# Patient Record
Sex: FEMALE | Race: WHITE | NOT HISPANIC OR LATINO | URBAN - METROPOLITAN AREA
[De-identification: names, ages, dates, MRNs, and addresses within clinical notes are randomized per-mention and may not be internally consistent; named-entity substitution may affect disease eponyms.]

---

## 2022-09-01 ENCOUNTER — APPOINTMENT (RX ONLY)
Dept: URBAN - METROPOLITAN AREA CLINIC 146 | Facility: CLINIC | Age: 62
Setting detail: DERMATOLOGY
End: 2022-09-01

## 2022-09-01 DIAGNOSIS — L55.9 SUNBURN, UNSPECIFIED: ICD-10-CM

## 2022-09-01 DIAGNOSIS — L57.0 ACTINIC KERATOSIS: ICD-10-CM

## 2022-09-01 DIAGNOSIS — D18.0 HEMANGIOMA: ICD-10-CM

## 2022-09-01 DIAGNOSIS — L82.0 INFLAMED SEBORRHEIC KERATOSIS: ICD-10-CM

## 2022-09-01 DIAGNOSIS — L81.4 OTHER MELANIN HYPERPIGMENTATION: ICD-10-CM

## 2022-09-01 DIAGNOSIS — L82.1 OTHER SEBORRHEIC KERATOSIS: ICD-10-CM

## 2022-09-01 DIAGNOSIS — L98.8 OTHER SPECIFIED DISORDERS OF THE SKIN AND SUBCUTANEOUS TISSUE: ICD-10-CM

## 2022-09-01 DIAGNOSIS — L90.8 OTHER ATROPHIC DISORDERS OF SKIN: ICD-10-CM

## 2022-09-01 DIAGNOSIS — Z12.83 ENCOUNTER FOR SCREENING FOR MALIGNANT NEOPLASM OF SKIN: ICD-10-CM

## 2022-09-01 PROBLEM — D18.01 HEMANGIOMA OF SKIN AND SUBCUTANEOUS TISSUE: Status: ACTIVE | Noted: 2022-09-01

## 2022-09-01 PROCEDURE — ? RECOMMENDATIONS

## 2022-09-01 PROCEDURE — ? COUNSELING

## 2022-09-01 PROCEDURE — ? COSMETIC CONSULTATION: BOTOX

## 2022-09-01 PROCEDURE — 17110 DESTRUCTION B9 LES UP TO 14: CPT

## 2022-09-01 PROCEDURE — 99213 OFFICE O/P EST LOW 20 MIN: CPT | Mod: 25

## 2022-09-01 PROCEDURE — ? LIQUID NITROGEN

## 2022-09-01 PROCEDURE — ? PRESCRIPTION

## 2022-09-01 RX ORDER — FLUOROURACIL 2 G/40G
CREAM TOPICAL QHS
Qty: 40 | Refills: 0 | COMMUNITY
Start: 2022-09-01

## 2022-09-01 RX ADMIN — FLUOROURACIL: 2 CREAM TOPICAL at 00:00

## 2022-09-01 ASSESSMENT — LOCATION SIMPLE DESCRIPTION DERM
LOCATION SIMPLE: LEFT ANTERIOR NECK
LOCATION SIMPLE: RIGHT UPPER BACK
LOCATION SIMPLE: UPPER BACK
LOCATION SIMPLE: LEFT SHOULDER
LOCATION SIMPLE: ABDOMEN
LOCATION SIMPLE: CHEST
LOCATION SIMPLE: LEFT UPPER BACK
LOCATION SIMPLE: RIGHT SHOULDER
LOCATION SIMPLE: LEFT SUPERIOR EYELID

## 2022-09-01 ASSESSMENT — LOCATION DETAILED DESCRIPTION DERM
LOCATION DETAILED: UPPER STERNUM
LOCATION DETAILED: LEFT INFERIOR ANTERIOR NECK
LOCATION DETAILED: LEFT LATERAL SUPERIOR EYELID
LOCATION DETAILED: LEFT SUPERIOR UPPER BACK
LOCATION DETAILED: RIGHT MEDIAL SUPERIOR CHEST
LOCATION DETAILED: RIGHT SUPERIOR UPPER BACK
LOCATION DETAILED: RIGHT ANTERIOR SHOULDER
LOCATION DETAILED: RIGHT INFERIOR MEDIAL UPPER BACK
LOCATION DETAILED: EPIGASTRIC SKIN
LOCATION DETAILED: LEFT INFERIOR MEDIAL UPPER BACK
LOCATION DETAILED: LEFT ANTERIOR SHOULDER
LOCATION DETAILED: LEFT MEDIAL SUPERIOR CHEST
LOCATION DETAILED: SUBXIPHOID
LOCATION DETAILED: SUPERIOR THORACIC SPINE

## 2022-09-01 ASSESSMENT — LOCATION ZONE DERM
LOCATION ZONE: NECK
LOCATION ZONE: TRUNK
LOCATION ZONE: EYELID
LOCATION ZONE: ARM

## 2022-09-01 NOTE — PROCEDURE: RECOMMENDATIONS
Recommendation Preamble: The following recommendations were made during the visit: hats, sunglasses and UV clothing.
Detail Level: Zone
Render Risk Assessment In Note?: no

## 2022-09-01 NOTE — PROCEDURE: COUNSELING
Detail Level: Zone
Sunscreen Recommendations: Sunblock with zinc encouraged.
Detail Level: Detailed
Detail Level: Simple

## 2022-09-01 NOTE — PROCEDURE: LIQUID NITROGEN
Spray Paint Text: The liquid nitrogen was applied to the skin utilizing a spray paint frosting technique.
Render Post-Care Instructions In Note?: no
Show Applicator Variable?: Yes
Post-Care Instructions: I reviewed with the patient in detail post-care instructions. Patient is to wear sunprotection, and avoid picking at any of the treated lesions. Pt may apply Vaseline to crusted or scabbing areas.
Detail Level: Detailed
Medical Necessity Information: It is in your best interest to select a reason for this procedure from the list below. All of these items fulfill various CMS LCD requirements except the new and changing color options.
Consent: The patient's consent was obtained including but not limited to risks of crusting, scabbing, blistering, scarring, darker or lighter pigmentary change, recurrence, incomplete removal and infection.
Medical Necessity Clause: This procedure was medically necessary because the lesions that were treated were:

## 2022-10-12 ENCOUNTER — APPOINTMENT (RX ONLY)
Dept: URBAN - METROPOLITAN AREA CLINIC 146 | Facility: CLINIC | Age: 62
Setting detail: DERMATOLOGY
End: 2022-10-12

## 2022-10-12 DIAGNOSIS — H02.83 DERMATOCHALASIS OF EYELID: ICD-10-CM

## 2022-10-12 DIAGNOSIS — H02.3 BLEPHAROCHALASIS: ICD-10-CM | Status: WORSENING

## 2022-10-12 DIAGNOSIS — Z41.1 ENCOUNTER FOR COSMETIC SURGERY: ICD-10-CM

## 2022-10-12 PROBLEM — H02.839 DERMATOCHALASIS OF UNSPECIFIED EYE, UNSPECIFIED EYELID: Status: ACTIVE | Noted: 2022-10-12

## 2022-10-12 PROBLEM — H02.30 BLEPHAROCHALASIS UNSPECIFIED EYE, UNSPECIFIED EYELID: Status: ACTIVE | Noted: 2022-10-12

## 2022-10-12 PROCEDURE — 99213 OFFICE O/P EST LOW 20 MIN: CPT

## 2022-10-12 PROCEDURE — ? COUNSELING - DERMATOCHALASIS

## 2022-10-12 PROCEDURE — ? COUNSELING - BLEPHAROCHALASIS

## 2022-10-12 PROCEDURE — ? CONSULTATION - FACELIFT

## 2022-10-12 ASSESSMENT — LOCATION SIMPLE DESCRIPTION DERM
LOCATION SIMPLE: LEFT CHEEK
LOCATION SIMPLE: LEFT SUPERIOR EYELID
LOCATION SIMPLE: RIGHT SUPERIOR EYELID

## 2022-10-12 ASSESSMENT — LOCATION ZONE DERM
LOCATION ZONE: FACE
LOCATION ZONE: EYELID

## 2022-10-12 ASSESSMENT — LOCATION DETAILED DESCRIPTION DERM
LOCATION DETAILED: LEFT CENTRAL MALAR CHEEK
LOCATION DETAILED: LEFT LATERAL SUPERIOR EYELID
LOCATION DETAILED: RIGHT LATERAL SUPERIOR EYELID

## 2022-10-12 NOTE — PROCEDURE: COUNSELING - DERMATOCHALASIS
Count Minor/Major Decisions Toward Mdm (Not Cosmetic)?: Yes
Detail Level: Detailed
Count Minor/Major Decisions Toward Mdm (Not Cosmetic)?: No

## 2022-10-12 NOTE — PROCEDURE: CONSULTATION - FACELIFT
Detail Level: Zone
Procedure Quote $ (Will Render In Note. Use Numbers Only, No Text Please.): 3950.00
Count Minor/Major Decisions Toward Mdm (Not Cosmetic)?: No

## 2022-10-12 NOTE — HPI: EYE (DERMATOCHALASIS)
How Severe Is The Dermatochalasis?: moderate
Is This A New Presentation, Or A Follow-Up?: Dermatochalasis
Additional History: Patient wears reading glasses, + dry eyes, had lasik Discussed risks, benefits and cautions of procedure, including but not limited to scarring, slow wound healing, bleeding, infection, and/or recurrence if not completely removed. 22yrs ago.  Former smoker, - blood thinners, no cataracts, no glaucoma

## 2023-02-10 ENCOUNTER — RX ONLY (OUTPATIENT)
Age: 63
Setting detail: RX ONLY
End: 2023-02-10

## 2023-02-10 RX ORDER — ERYTHROMYCIN 5 MG/G
OINTMENT OPHTHALMIC
Qty: 3.5 | Refills: 0 | Status: ERX

## 2023-02-10 RX ORDER — DIAZEPAM 10 MG/1
TABLET ORAL
Qty: 1 | Refills: 0 | Status: ERX

## 2023-02-10 RX ORDER — OXYCODONE HYDROCHLORIDE AND ACETAMINOPHEN 5; 325 MG/1; MG/1
TABLET ORAL
Qty: 18 | Refills: 0 | Status: ERX

## 2023-02-10 RX ORDER — ONDANSETRON 8 MG/1
TABLET, ORALLY DISINTEGRATING ORAL
Qty: 8 | Refills: 0 | Status: ERX

## 2023-02-13 ENCOUNTER — RX ONLY (OUTPATIENT)
Age: 63
Setting detail: RX ONLY
End: 2023-02-13

## 2023-02-13 RX ORDER — CEPHALEXIN 500 MG/1
CAPSULE ORAL
Qty: 21 | Refills: 0 | Status: ERX

## 2023-02-14 ENCOUNTER — APPOINTMENT (RX ONLY)
Dept: URBAN - METROPOLITAN AREA CLINIC 146 | Facility: CLINIC | Age: 63
Setting detail: DERMATOLOGY
End: 2023-02-14

## 2023-02-14 DIAGNOSIS — Z41.1 ENCOUNTER FOR COSMETIC SURGERY: ICD-10-CM

## 2023-02-14 DIAGNOSIS — H02.83 DERMATOCHALASIS OF EYELID: ICD-10-CM

## 2023-02-14 PROBLEM — H02.839 DERMATOCHALASIS OF UNSPECIFIED EYE, UNSPECIFIED EYELID: Status: ACTIVE | Noted: 2023-02-14

## 2023-02-14 PROCEDURE — ? FACE LIFT

## 2023-02-14 PROCEDURE — ? BLEPHAROPLASTY

## 2023-02-14 NOTE — PROCEDURE: BLEPHAROPLASTY
Intro: The patient was prepared and draped in the usual sterile fashion.
Surgeon: Dr. Corbin Reese
Preparation: A time-out was taken prior to the procedure to identify the patient and surgical sites. An intravenous line was established and cardiac monitors were placed.
Skin Marking: The area of eyelid skin to be excised from the upper eyelid was marked out with a marking pen.
Local Anesthesia Volume In Cc: 0
Hemostasis: electrocautery
Estimated Blood Loss (Cc): minimal
Skin Closure: simple interrupted
Skin Marking: Incisions were marked out with a marking pen approximately 1 mm inferior to the lash line from the level of the inferior punctum and extending laterally from the lateral canthus in a slightly downward direction for approximately 1 cm.
Conjunctival Closure Sutures: 6-0 fast absorbing plain gut
Consent: The risks, benefits, expectations and alternatives of blepharoplasty were discussed in detail with the patient, including, but not limited to, the risks of postoperative infection, bleeding, delayed healing, injury to the globe,  scarring, pain, asymmetry, loss of vision, lagophthalmos, ectropion, entropion, corneal abrasion, possible need for additional procedures, and dissatisfaction with cosmetic outcome. The patient verbalized understanding and nformed consent was obtained.
Detail Level: Generalized

## 2023-02-14 NOTE — PROCEDURE: FACE LIFT
Liposuction Volume In Cc: 0
Positioning: The patient was brought to the operating room and placed in the standard supine position in the usual manner. After placement of monitors, anesthesia was achieved as above uneventfully.
Postauricular Epidermal Closure: horizontal mattress
Skin Redraping: The skin was then redraped on both sides, creating a sharp, cervico-mental angle, while minimizing undue tension on the skin flaps. Key sutures were placed near the anterior hairline and the apex of the posterior hairline. A slit was fashioned for delivery of the ear lobule, being careful to avoid pixie ear deformity. The redundant skin was then carefully and precisely marked, and sharply resected.
Skin Flap Text: A skin flap was raised superficial to the SMAS layer down to the mastoid fascia posteriorly. The flap was dissected at the level of the SMAS until the marked area of the limit of dissection was reached. Care was paid to avoidance of injury to the facial nerve and its branches, and to the greater auricular and spinal accessory nerves. This sequence was conducted first on the right side, and then on the left. Once the flaps were raised, attention was directed to the submental region.
Estimated Blood Loss (Cc): minimal
Detail Level: Zone
Surgeon: Dr. Reuben Richard
Hemostasis: electrocautery
Drain Text: The drains were placed through a stab incision in the mastoid scalp and delivered under the flap. Each drain was secured with 4-0 Nylon suture. Each drain was connected to the suction device at the completion of the procedure.
Consent: The risks, benefits, expectations and alternatives of a face lift were discussed with the patient including, but not limited to, infection, bleeding, injury to nerves, blood vessels or other structures, delayed healing, visible scarring, contour irregularities, asymmetry, cosmetic dissatisfaction and unplanned return to the operating room. The patient read and signed the consent form and verbalized full understanding. A time-out was performed confirming the identities of the patient and surgeon, the operative site(s) and the operative plan per the informed consent.

## 2023-02-15 ENCOUNTER — APPOINTMENT (RX ONLY)
Dept: URBAN - METROPOLITAN AREA CLINIC 146 | Facility: CLINIC | Age: 63
Setting detail: DERMATOLOGY
End: 2023-02-15

## 2023-02-15 DIAGNOSIS — Z48.89 ENCOUNTER FOR OTHER SPECIFIED SURGICAL AFTERCARE: ICD-10-CM

## 2023-02-15 PROCEDURE — ? POST OP EVALUATION BLEPHAROPLASTY

## 2023-02-15 PROCEDURE — 99024 POSTOP FOLLOW-UP VISIT: CPT

## 2023-02-15 PROCEDURE — ? POST-OP CHECK

## 2023-02-15 ASSESSMENT — LOCATION DETAILED DESCRIPTION DERM
LOCATION DETAILED: LEFT TRAGUS
LOCATION DETAILED: RIGHT TRAGUS

## 2023-02-15 ASSESSMENT — LOCATION SIMPLE DESCRIPTION DERM
LOCATION SIMPLE: RIGHT EAR
LOCATION SIMPLE: LEFT EAR

## 2023-02-15 ASSESSMENT — LOCATION ZONE DERM: LOCATION ZONE: EAR

## 2023-02-15 NOTE — PROCEDURE: POST-OP CHECK
Detail Level: Detailed
Add Postop Global No-Charge Code (68951)?: yes
Wound Evaluated By: Dr. Jeanne Avendano
Wound Evaluated By: Dr. Jesus Lora

## 2023-02-21 ENCOUNTER — APPOINTMENT (RX ONLY)
Dept: URBAN - METROPOLITAN AREA CLINIC 146 | Facility: CLINIC | Age: 63
Setting detail: DERMATOLOGY
End: 2023-02-21

## 2023-02-21 DIAGNOSIS — Z48.89 ENCOUNTER FOR OTHER SPECIFIED SURGICAL AFTERCARE: ICD-10-CM

## 2023-02-21 PROCEDURE — 99024 POSTOP FOLLOW-UP VISIT: CPT

## 2023-02-21 PROCEDURE — ? SUTURE REMOVAL

## 2023-02-21 ASSESSMENT — LOCATION DETAILED DESCRIPTION DERM
LOCATION DETAILED: LEFT SUPERIOR PREAURICULAR CHEEK
LOCATION DETAILED: LEFT LATERAL SUPERIOR EYELID
LOCATION DETAILED: RIGHT LATERAL SUPERIOR EYELID
LOCATION DETAILED: RIGHT TRAGUS

## 2023-02-21 ASSESSMENT — LOCATION ZONE DERM
LOCATION ZONE: FACE
LOCATION ZONE: EYELID
LOCATION ZONE: EAR

## 2023-02-21 ASSESSMENT — LOCATION SIMPLE DESCRIPTION DERM
LOCATION SIMPLE: LEFT CHEEK
LOCATION SIMPLE: LEFT SUPERIOR EYELID
LOCATION SIMPLE: RIGHT SUPERIOR EYELID
LOCATION SIMPLE: RIGHT EAR

## 2023-03-01 ENCOUNTER — APPOINTMENT (RX ONLY)
Dept: URBAN - METROPOLITAN AREA CLINIC 146 | Facility: CLINIC | Age: 63
Setting detail: DERMATOLOGY
End: 2023-03-01

## 2023-03-01 DIAGNOSIS — L21.8 OTHER SEBORRHEIC DERMATITIS: ICD-10-CM

## 2023-03-01 DIAGNOSIS — R22.9 LOCALIZED SWELLING, MASS AND LUMP, UNSPECIFIED: ICD-10-CM

## 2023-03-01 DIAGNOSIS — L81.4 OTHER MELANIN HYPERPIGMENTATION: ICD-10-CM

## 2023-03-01 DIAGNOSIS — L57.8 OTHER SKIN CHANGES DUE TO CHRONIC EXPOSURE TO NONIONIZING RADIATION: ICD-10-CM

## 2023-03-01 DIAGNOSIS — L98.8 OTHER SPECIFIED DISORDERS OF THE SKIN AND SUBCUTANEOUS TISSUE: ICD-10-CM

## 2023-03-01 DIAGNOSIS — Z12.83 ENCOUNTER FOR SCREENING FOR MALIGNANT NEOPLASM OF SKIN: ICD-10-CM

## 2023-03-01 DIAGNOSIS — D18.0 HEMANGIOMA: ICD-10-CM

## 2023-03-01 DIAGNOSIS — L82.1 OTHER SEBORRHEIC KERATOSIS: ICD-10-CM

## 2023-03-01 PROBLEM — D18.01 HEMANGIOMA OF SKIN AND SUBCUTANEOUS TISSUE: Status: ACTIVE | Noted: 2023-03-01

## 2023-03-01 PROCEDURE — ? PRESCRIPTION

## 2023-03-01 PROCEDURE — ? COUNSELING

## 2023-03-01 PROCEDURE — ? RECOMMENDATIONS

## 2023-03-01 PROCEDURE — ? PRESCRIPTION MEDICATION MANAGEMENT

## 2023-03-01 PROCEDURE — 99214 OFFICE O/P EST MOD 30 MIN: CPT

## 2023-03-01 RX ORDER — TRETIONIN 0.5 MG/G
CREAM TOPICAL QHS
Qty: 45 | Refills: 3 | Status: ERX | COMMUNITY
Start: 2023-03-01

## 2023-03-01 RX ADMIN — TRETIONIN: 0.5 CREAM TOPICAL at 00:00

## 2023-03-01 ASSESSMENT — LOCATION ZONE DERM
LOCATION ZONE: ARM
LOCATION ZONE: SCALP
LOCATION ZONE: EYELID
LOCATION ZONE: EAR
LOCATION ZONE: TRUNK

## 2023-03-01 ASSESSMENT — LOCATION SIMPLE DESCRIPTION DERM
LOCATION SIMPLE: ABDOMEN
LOCATION SIMPLE: RIGHT SHOULDER
LOCATION SIMPLE: UPPER BACK
LOCATION SIMPLE: LEFT SUPERIOR EYELID
LOCATION SIMPLE: LEFT UPPER BACK
LOCATION SIMPLE: RIGHT UPPER BACK
LOCATION SIMPLE: CHEST
LOCATION SIMPLE: RIGHT SUPERIOR EYELID
LOCATION SIMPLE: LEFT EAR
LOCATION SIMPLE: RIGHT EAR
LOCATION SIMPLE: LEFT SHOULDER
LOCATION SIMPLE: SCALP

## 2023-03-01 ASSESSMENT — LOCATION DETAILED DESCRIPTION DERM
LOCATION DETAILED: RIGHT LATERAL SUPERIOR EYELID
LOCATION DETAILED: SUBXIPHOID
LOCATION DETAILED: EPIGASTRIC SKIN
LOCATION DETAILED: LEFT CRUS OF HELIX
LOCATION DETAILED: RIGHT MEDIAL SUPERIOR CHEST
LOCATION DETAILED: RIGHT ANTERIOR SHOULDER
LOCATION DETAILED: RIGHT CAVUM CONCHA
LOCATION DETAILED: LEFT INFERIOR MEDIAL UPPER BACK
LOCATION DETAILED: UPPER STERNUM
LOCATION DETAILED: LEFT SUPERIOR UPPER BACK
LOCATION DETAILED: RIGHT INFERIOR MEDIAL UPPER BACK
LOCATION DETAILED: RIGHT CENTRAL POSTAURICULAR SKIN
LOCATION DETAILED: LEFT CENTRAL POSTAURICULAR SKIN
LOCATION DETAILED: MIDDLE STERNUM
LOCATION DETAILED: LEFT ANTERIOR SHOULDER
LOCATION DETAILED: SUPERIOR THORACIC SPINE
LOCATION DETAILED: LEFT LATERAL SUPERIOR EYELID
LOCATION DETAILED: LEFT MEDIAL SUPERIOR CHEST
LOCATION DETAILED: RIGHT SUPERIOR UPPER BACK

## 2023-03-01 NOTE — PROCEDURE: COUNSELING
Sunscreen Recommendations: Sunblock with zinc encouraged.
Detail Level: Zone
Detail Level: Detailed
Patient Specific Counseling (Will Not Stick From Patient To Patient): Rec OTC Hydrocortisone 1% cream pt advised to call if symptoms do not resolve

## 2023-03-01 NOTE — PROCEDURE: PRESCRIPTION MEDICATION MANAGEMENT
Detail Level: Zone
Initiate Treatment: Fluorouracil cream to nose, upper lip and chest ( pt has RX at home ) after wedding
Render In Strict Bullet Format?: No

## 2023-03-10 ENCOUNTER — APPOINTMENT (RX ONLY)
Dept: URBAN - METROPOLITAN AREA CLINIC 146 | Facility: CLINIC | Age: 63
Setting detail: DERMATOLOGY
End: 2023-03-10

## 2023-03-10 DIAGNOSIS — Z48.89 ENCOUNTER FOR OTHER SPECIFIED SURGICAL AFTERCARE: ICD-10-CM

## 2023-03-10 PROCEDURE — ? POST-OP CHECK

## 2023-03-10 PROCEDURE — ? POST OP EVALUATION BLEPHAROPLASTY

## 2023-03-10 PROCEDURE — 99024 POSTOP FOLLOW-UP VISIT: CPT

## 2023-03-10 ASSESSMENT — LOCATION ZONE DERM
LOCATION ZONE: EYELID
LOCATION ZONE: EAR

## 2023-03-10 ASSESSMENT — LOCATION DETAILED DESCRIPTION DERM
LOCATION DETAILED: RIGHT LATERAL SUPERIOR EYELID
LOCATION DETAILED: LEFT LATERAL SUPERIOR EYELID
LOCATION DETAILED: RIGHT ANTERIOR EARLOBE

## 2023-03-10 ASSESSMENT — LOCATION SIMPLE DESCRIPTION DERM
LOCATION SIMPLE: LEFT SUPERIOR EYELID
LOCATION SIMPLE: RIGHT EAR
LOCATION SIMPLE: RIGHT SUPERIOR EYELID

## 2023-03-10 NOTE — PROCEDURE: POST-OP CHECK
Body Location Override (Optional): bilateral  pre/post auricular
Detail Level: Detailed
Add Postop Global No-Charge Code (81677)?: yes
Wound Evaluated By: Dr. Corbin Reese

## 2023-05-08 ENCOUNTER — APPOINTMENT (RX ONLY)
Dept: URBAN - METROPOLITAN AREA CLINIC 146 | Facility: CLINIC | Age: 63
Setting detail: DERMATOLOGY
End: 2023-05-08

## 2023-05-08 DIAGNOSIS — Z48.89 ENCOUNTER FOR OTHER SPECIFIED SURGICAL AFTERCARE: ICD-10-CM

## 2023-05-08 PROCEDURE — 99024 POSTOP FOLLOW-UP VISIT: CPT

## 2023-05-08 PROCEDURE — ? POST OP EVALUATION BLEPHAROPLASTY

## 2023-05-08 PROCEDURE — ? POST-OP CHECK

## 2023-05-08 ASSESSMENT — LOCATION SIMPLE DESCRIPTION DERM
LOCATION SIMPLE: RIGHT SUPERIOR EYELID
LOCATION SIMPLE: RIGHT EAR
LOCATION SIMPLE: LEFT SUPERIOR EYELID

## 2023-05-08 ASSESSMENT — LOCATION DETAILED DESCRIPTION DERM
LOCATION DETAILED: LEFT LATERAL SUPERIOR EYELID
LOCATION DETAILED: RIGHT ANTERIOR EARLOBE
LOCATION DETAILED: RIGHT LATERAL SUPERIOR EYELID

## 2023-05-08 ASSESSMENT — LOCATION ZONE DERM
LOCATION ZONE: EYELID
LOCATION ZONE: EAR

## 2023-05-08 NOTE — PROCEDURE: POST-OP CHECK
Body Location Override (Optional): bilateral  pre/post auricular
Detail Level: Detailed
Add Postop Global No-Charge Code (08839)?: yes
Wound Evaluated By: Dr. Rekha Iqbal

## 2023-09-01 ENCOUNTER — APPOINTMENT (RX ONLY)
Dept: URBAN - METROPOLITAN AREA CLINIC 146 | Facility: CLINIC | Age: 63
Setting detail: DERMATOLOGY
End: 2023-09-01

## 2023-09-01 DIAGNOSIS — Z12.83 ENCOUNTER FOR SCREENING FOR MALIGNANT NEOPLASM OF SKIN: ICD-10-CM

## 2023-09-01 DIAGNOSIS — L90.5 SCAR CONDITIONS AND FIBROSIS OF SKIN: ICD-10-CM

## 2023-09-01 DIAGNOSIS — L82.1 OTHER SEBORRHEIC KERATOSIS: ICD-10-CM

## 2023-09-01 DIAGNOSIS — D18.0 HEMANGIOMA: ICD-10-CM

## 2023-09-01 DIAGNOSIS — Z85.828 PERSONAL HISTORY OF OTHER MALIGNANT NEOPLASM OF SKIN: ICD-10-CM

## 2023-09-01 DIAGNOSIS — L57.0 ACTINIC KERATOSIS: ICD-10-CM

## 2023-09-01 DIAGNOSIS — L81.4 OTHER MELANIN HYPERPIGMENTATION: ICD-10-CM

## 2023-09-01 DIAGNOSIS — L98.8 OTHER SPECIFIED DISORDERS OF THE SKIN AND SUBCUTANEOUS TISSUE: ICD-10-CM

## 2023-09-01 PROBLEM — D18.01 HEMANGIOMA OF SKIN AND SUBCUTANEOUS TISSUE: Status: ACTIVE | Noted: 2023-09-01

## 2023-09-01 PROCEDURE — 99213 OFFICE O/P EST LOW 20 MIN: CPT | Mod: 25

## 2023-09-01 PROCEDURE — ? COUNSELING

## 2023-09-01 PROCEDURE — ? LIQUID NITROGEN

## 2023-09-01 PROCEDURE — 17000 DESTRUCT PREMALG LESION: CPT

## 2023-09-01 PROCEDURE — ? RECOMMENDATIONS

## 2023-09-01 ASSESSMENT — LOCATION SIMPLE DESCRIPTION DERM
LOCATION SIMPLE: UPPER BACK
LOCATION SIMPLE: RIGHT UPPER BACK
LOCATION SIMPLE: RIGHT LIP
LOCATION SIMPLE: ABDOMEN
LOCATION SIMPLE: RIGHT ZYGOMA
LOCATION SIMPLE: CHEST
LOCATION SIMPLE: RIGHT PRETIBIAL REGION
LOCATION SIMPLE: LEFT UPPER BACK
LOCATION SIMPLE: RIGHT SHOULDER
LOCATION SIMPLE: LEFT SHOULDER

## 2023-09-01 ASSESSMENT — LOCATION DETAILED DESCRIPTION DERM
LOCATION DETAILED: RIGHT MEDIAL SUPERIOR CHEST
LOCATION DETAILED: LEFT MEDIAL SUPERIOR CHEST
LOCATION DETAILED: RIGHT ANTERIOR SHOULDER
LOCATION DETAILED: LEFT SUPERIOR UPPER BACK
LOCATION DETAILED: RIGHT LATERAL ZYGOMA
LOCATION DETAILED: SUBXIPHOID
LOCATION DETAILED: LEFT ANTERIOR SHOULDER
LOCATION DETAILED: EPIGASTRIC SKIN
LOCATION DETAILED: SUPERIOR THORACIC SPINE
LOCATION DETAILED: UPPER STERNUM
LOCATION DETAILED: LEFT INFERIOR MEDIAL UPPER BACK
LOCATION DETAILED: RIGHT UPPER CUTANEOUS LIP
LOCATION DETAILED: RIGHT DISTAL PRETIBIAL REGION
LOCATION DETAILED: RIGHT SUPERIOR UPPER BACK
LOCATION DETAILED: RIGHT INFERIOR MEDIAL UPPER BACK

## 2023-09-01 ASSESSMENT — LOCATION ZONE DERM
LOCATION ZONE: FACE
LOCATION ZONE: LEG
LOCATION ZONE: ARM
LOCATION ZONE: LIP
LOCATION ZONE: TRUNK

## 2023-09-01 NOTE — PROCEDURE: COUNSELING
Detail Level: Zone
Sunscreen Recommendations: Sunblock with zinc encouraged.
Patient Specific Counseling (Will Not Stick From Patient To Patient): Discussed chemical peel in the winter to even out the skin tone.
Detail Level: Detailed
Patient Specific Counseling (Will Not Stick From Patient To Patient): Recommend Silagen scar gel
Patient Specific Counseling (Will Not Stick From Patient To Patient): Continue hyaluronic acid to the arms and chest, recommend Vanicream Vitamin C cream to face and chest, and Tretinoin for the face and chest.

## 2023-09-01 NOTE — PROCEDURE: LIQUID NITROGEN
Application Tool (Optional): Liquid Nitrogen Sprayer
Show Aperture Variable?: Yes
Consent: The patient's consent was obtained.
Duration Of Freeze Thaw-Cycle (Seconds): 0
Render Post-Care Instructions In Note?: no
Detail Level: Detailed
Post-Care Instructions: I reviewed with the patient in detail post-care instructions. Pt may apply Vaseline to crusted or scabbing areas.

## 2024-09-04 ENCOUNTER — APPOINTMENT (RX ONLY)
Dept: URBAN - METROPOLITAN AREA CLINIC 146 | Facility: CLINIC | Age: 64
Setting detail: DERMATOLOGY
End: 2024-09-04

## 2024-09-04 DIAGNOSIS — D18.0 HEMANGIOMA: ICD-10-CM

## 2024-09-04 DIAGNOSIS — L57.0 ACTINIC KERATOSIS: ICD-10-CM

## 2024-09-04 DIAGNOSIS — L82.1 OTHER SEBORRHEIC KERATOSIS: ICD-10-CM

## 2024-09-04 DIAGNOSIS — L81.4 OTHER MELANIN HYPERPIGMENTATION: ICD-10-CM

## 2024-09-04 DIAGNOSIS — L82.0 INFLAMED SEBORRHEIC KERATOSIS: ICD-10-CM

## 2024-09-04 DIAGNOSIS — Z85.828 PERSONAL HISTORY OF OTHER MALIGNANT NEOPLASM OF SKIN: ICD-10-CM

## 2024-09-04 DIAGNOSIS — L90.5 SCAR CONDITIONS AND FIBROSIS OF SKIN: ICD-10-CM

## 2024-09-04 DIAGNOSIS — Z12.83 ENCOUNTER FOR SCREENING FOR MALIGNANT NEOPLASM OF SKIN: ICD-10-CM

## 2024-09-04 PROBLEM — D18.01 HEMANGIOMA OF SKIN AND SUBCUTANEOUS TISSUE: Status: ACTIVE | Noted: 2024-09-04

## 2024-09-04 PROCEDURE — 17003 DESTRUCT PREMALG LES 2-14: CPT | Mod: 59

## 2024-09-04 PROCEDURE — ? LIQUID NITROGEN

## 2024-09-04 PROCEDURE — ? COUNSELING

## 2024-09-04 PROCEDURE — 99213 OFFICE O/P EST LOW 20 MIN: CPT | Mod: 25

## 2024-09-04 PROCEDURE — 17000 DESTRUCT PREMALG LESION: CPT | Mod: 59

## 2024-09-04 PROCEDURE — ? RECOMMENDATIONS

## 2024-09-04 PROCEDURE — ? PRESCRIPTION

## 2024-09-04 PROCEDURE — 17110 DESTRUCTION B9 LES UP TO 14: CPT

## 2024-09-04 PROCEDURE — ? PRESCRIPTION MEDICATION MANAGEMENT

## 2024-09-04 PROCEDURE — ? DIAGNOSIS COMMENT

## 2024-09-04 ASSESSMENT — LOCATION SIMPLE DESCRIPTION DERM
LOCATION SIMPLE: UPPER BACK
LOCATION SIMPLE: LEFT CHEEK
LOCATION SIMPLE: RIGHT UPPER BACK
LOCATION SIMPLE: RIGHT SHOULDER
LOCATION SIMPLE: RIGHT PRETIBIAL REGION
LOCATION SIMPLE: ABDOMEN
LOCATION SIMPLE: LEFT UPPER BACK
LOCATION SIMPLE: LEFT SHOULDER
LOCATION SIMPLE: RIGHT LIP
LOCATION SIMPLE: RIGHT ZYGOMA
LOCATION SIMPLE: RIGHT HAND
LOCATION SIMPLE: RIGHT CHEEK
LOCATION SIMPLE: CHEST
LOCATION SIMPLE: LEFT HAND
LOCATION SIMPLE: RIGHT THIGH
LOCATION SIMPLE: RIGHT LIP

## 2024-09-04 ASSESSMENT — LOCATION ZONE DERM
LOCATION ZONE: FACE
LOCATION ZONE: ARM
LOCATION ZONE: LEG
LOCATION ZONE: HAND
LOCATION ZONE: LIP
LOCATION ZONE: LIP
LOCATION ZONE: TRUNK

## 2024-09-04 ASSESSMENT — LOCATION DETAILED DESCRIPTION DERM
LOCATION DETAILED: EPIGASTRIC SKIN
LOCATION DETAILED: RIGHT SUPERIOR UPPER BACK
LOCATION DETAILED: RIGHT ANTERIOR DISTAL THIGH
LOCATION DETAILED: RIGHT UPPER CUTANEOUS LIP
LOCATION DETAILED: LEFT ANTERIOR SHOULDER
LOCATION DETAILED: SUPERIOR THORACIC SPINE
LOCATION DETAILED: RIGHT INFERIOR MEDIAL UPPER BACK
LOCATION DETAILED: LEFT INFERIOR MEDIAL UPPER BACK
LOCATION DETAILED: LEFT INFERIOR CENTRAL MALAR CHEEK
LOCATION DETAILED: RIGHT MEDIAL SUPERIOR CHEST
LOCATION DETAILED: LEFT RADIAL DORSAL HAND
LOCATION DETAILED: RIGHT LATERAL ZYGOMA
LOCATION DETAILED: LEFT MEDIAL SUPERIOR CHEST
LOCATION DETAILED: LEFT SUPERIOR UPPER BACK
LOCATION DETAILED: RIGHT ANTERIOR SHOULDER
LOCATION DETAILED: RIGHT INFERIOR CENTRAL MALAR CHEEK
LOCATION DETAILED: SUBXIPHOID
LOCATION DETAILED: UPPER STERNUM
LOCATION DETAILED: RIGHT DISTAL PRETIBIAL REGION
LOCATION DETAILED: RIGHT LATERAL PROXIMAL PRETIBIAL REGION
LOCATION DETAILED: RIGHT RADIAL DORSAL HAND
LOCATION DETAILED: RIGHT UPPER CUTANEOUS LIP

## 2024-09-04 NOTE — PROCEDURE: DIAGNOSIS COMMENT
Detail Level: Simple
Render Risk Assessment In Note?: no
Comment: Basal cell carcinoma of skin: (right upper cutaneous lip) Tx: excision 10/24/2011 Dr. Armando ceja

## 2024-09-04 NOTE — PROCEDURE: LIQUID NITROGEN
Post-Care Instructions: I reviewed with the patient in detail post-care instructions. Patient is to wear sunprotection. Pt may apply Vaseline to crusted or scabbing areas.
Render Note In Bullet Format When Appropriate: No
Application Tool (Optional): Liquid Nitrogen Sprayer
Show Aperture Variable?: Yes
Consent: The patient's consent was obtained.
Detail Level: Simple
Duration Of Freeze Thaw-Cycle (Seconds): 0
Detail Level: Detailed
Spray Paint Text: The liquid nitrogen was applied to the skin utilizing a spray paint frosting technique.
Medical Necessity Clause: This procedure was medically necessary because the lesions that were treated were:
Medical Necessity Information: It is in your best interest to select a reason for this procedure from the list below. All of these items fulfill various CMS LCD requirements except the new and changing color options.

## 2024-09-04 NOTE — PROCEDURE: COUNSELING
Detail Level: Simple
Detail Level: Detailed
Detail Level: Zone
Sunscreen Recommendations: Sunblock with zinc encouraged.
Patient Specific Counseling (Will Not Stick From Patient To Patient): Recommend Silagen scar gel
Patient Specific Counseling (Will Not Stick From Patient To Patient): Discussed chemical peel in the winter to even out the skin tone.
Patient Specific Counseling (Will Not Stick From Patient To Patient): Recommend exfoliating wash cloth or gloves

## 2024-09-04 NOTE — PROCEDURE: PRESCRIPTION MEDICATION MANAGEMENT
Render In Strict Bullet Format?: No
Detail Level: Zone
Initiate Treatment: BLD cream apply to face at bedtime